# Patient Record
Sex: FEMALE | Race: BLACK OR AFRICAN AMERICAN | NOT HISPANIC OR LATINO | Employment: OTHER | URBAN - METROPOLITAN AREA
[De-identification: names, ages, dates, MRNs, and addresses within clinical notes are randomized per-mention and may not be internally consistent; named-entity substitution may affect disease eponyms.]

---

## 2020-02-19 PROBLEM — R53.83 FATIGUE: Status: ACTIVE | Noted: 2020-02-19

## 2020-02-19 PROBLEM — I10 HYPERTENSION: Status: ACTIVE | Noted: 2020-02-19

## 2020-02-19 PROBLEM — Z13.29 THYROID DISORDER SCREEN: Status: ACTIVE | Noted: 2020-02-19

## 2020-02-19 PROBLEM — M54.32 SCIATICA OF LEFT SIDE: Status: ACTIVE | Noted: 2020-02-19

## 2020-02-19 PROBLEM — E11.9 TYPE 2 DIABETES MELLITUS, WITHOUT LONG-TERM CURRENT USE OF INSULIN: Status: ACTIVE | Noted: 2020-02-19

## 2020-02-19 PROBLEM — M54.50 LUMBAR SPINE PAIN: Status: ACTIVE | Noted: 2020-02-19

## 2020-03-11 PROBLEM — Z72.0 TOBACCO ABUSE: Status: ACTIVE | Noted: 2020-03-11

## 2020-03-11 PROBLEM — E55.9 VITAMIN D DEFICIENCY: Status: ACTIVE | Noted: 2020-03-11

## 2020-03-11 PROBLEM — M48.061 SPINAL STENOSIS OF LUMBAR REGION: Status: ACTIVE | Noted: 2020-03-11

## 2020-05-25 PROBLEM — Z13.29 THYROID DISORDER SCREEN: Status: RESOLVED | Noted: 2020-02-19 | Resolved: 2020-05-25

## 2020-10-22 PROBLEM — K64.9 HEMORRHOIDS: Status: ACTIVE | Noted: 2020-10-22

## 2020-10-22 PROBLEM — K59.09 OTHER CONSTIPATION: Status: ACTIVE | Noted: 2020-10-22

## 2020-11-26 PROBLEM — E87.6 HYPOKALEMIA: Status: ACTIVE | Noted: 2020-11-26

## 2020-11-26 PROBLEM — E78.5 HLD (HYPERLIPIDEMIA): Status: ACTIVE | Noted: 2020-11-26

## 2020-11-26 PROBLEM — I24.9 ACS (ACUTE CORONARY SYNDROME): Status: ACTIVE | Noted: 2020-11-26

## 2020-11-26 PROBLEM — R07.9 CHEST PAIN: Status: ACTIVE | Noted: 2020-11-26

## 2020-12-03 PROBLEM — Z12.11 SCREENING FOR MALIGNANT NEOPLASM OF COLON: Status: ACTIVE | Noted: 2020-12-03

## 2020-12-03 LAB — CRC RECOMMENDATION EXT: NORMAL

## 2021-07-01 ENCOUNTER — PATIENT MESSAGE (OUTPATIENT)
Dept: ADMINISTRATIVE | Facility: OTHER | Age: 66
End: 2021-07-01

## 2021-07-08 LAB
LEFT EYE DM RETINOPATHY: POSITIVE
RIGHT EYE DM RETINOPATHY: POSITIVE

## 2021-10-25 LAB
LEFT EYE DM RETINOPATHY: NEGATIVE
RIGHT EYE DM RETINOPATHY: NEGATIVE

## 2021-11-11 PROBLEM — R53.83 FATIGUE: Status: RESOLVED | Noted: 2020-02-19 | Resolved: 2021-11-11

## 2021-11-11 PROBLEM — R07.9 CHEST PAIN: Status: RESOLVED | Noted: 2020-11-26 | Resolved: 2021-11-11

## 2022-05-25 PROBLEM — N39.3 STRESS INCONTINENCE (FEMALE) (MALE): Status: ACTIVE | Noted: 2017-04-04

## 2022-05-25 PROBLEM — R94.31 ABNORMAL EKG: Status: ACTIVE | Noted: 2019-07-23

## 2022-05-25 PROBLEM — F41.8 MIXED ANXIETY DEPRESSIVE DISORDER: Status: ACTIVE | Noted: 2018-10-04

## 2022-05-25 PROBLEM — G89.29 CHRONIC LEFT SHOULDER PAIN: Status: ACTIVE | Noted: 2022-05-25

## 2022-05-25 PROBLEM — E11.8 COMPLICATION OF DIABETES MELLITUS: Status: ACTIVE | Noted: 2019-07-16

## 2022-05-25 PROBLEM — M25.512 CHRONIC LEFT SHOULDER PAIN: Status: ACTIVE | Noted: 2022-05-25

## 2022-05-25 PROBLEM — E11.51 TYPE 2 DIABETES MELLITUS WITH DIABETIC PERIPHERAL ANGIOPATHY WITHOUT GANGRENE: Status: ACTIVE | Noted: 2019-07-16

## 2022-05-25 PROBLEM — I10 UNCONTROLLED HYPERTENSION: Status: ACTIVE | Noted: 2022-05-25

## 2022-10-17 PROBLEM — I73.9 PERIPHERAL VASCULAR DISEASE: Status: ACTIVE | Noted: 2018-07-03

## 2022-11-29 PROBLEM — M67.912 TENDINOPATHY OF ROTATOR CUFF, LEFT: Status: ACTIVE | Noted: 2022-11-29

## 2022-12-08 PROBLEM — R06.02 SOB (SHORTNESS OF BREATH): Status: ACTIVE | Noted: 2022-12-08

## 2022-12-08 PROBLEM — J44.1 COPD EXACERBATION: Status: ACTIVE | Noted: 2022-12-08

## 2022-12-08 PROBLEM — R09.02 HYPOXIA: Status: ACTIVE | Noted: 2022-12-08

## 2022-12-16 ENCOUNTER — PATIENT OUTREACH (OUTPATIENT)
Dept: ADMINISTRATIVE | Facility: HOSPITAL | Age: 67
End: 2022-12-16

## 2023-02-07 ENCOUNTER — PATIENT OUTREACH (OUTPATIENT)
Dept: ADMINISTRATIVE | Facility: HOSPITAL | Age: 68
End: 2023-02-07

## 2023-02-13 LAB
LEFT EYE DM RETINOPATHY: NEGATIVE
RIGHT EYE DM RETINOPATHY: NEGATIVE

## 2023-02-20 ENCOUNTER — PATIENT OUTREACH (OUTPATIENT)
Dept: ADMINISTRATIVE | Facility: HOSPITAL | Age: 68
End: 2023-02-20

## 2023-03-13 ENCOUNTER — PATIENT OUTREACH (OUTPATIENT)
Dept: ADMINISTRATIVE | Facility: HOSPITAL | Age: 68
End: 2023-03-13

## 2023-04-13 ENCOUNTER — PATIENT MESSAGE (OUTPATIENT)
Dept: ADMINISTRATIVE | Facility: OTHER | Age: 68
End: 2023-04-13

## 2023-04-21 PROBLEM — D51.0 PERNICIOUS ANEMIA: Status: ACTIVE | Noted: 2023-04-21

## 2024-10-01 PROBLEM — M67.40 GANGLION CYST: Status: ACTIVE | Noted: 2024-10-01

## 2024-11-04 ENCOUNTER — TELEPHONE (OUTPATIENT)
Dept: SMOKING CESSATION | Facility: CLINIC | Age: 69
End: 2024-11-04
Payer: MEDICARE

## 2024-11-04 NOTE — TELEPHONE ENCOUNTER
Called patient to confirm clinic intake appointment for smoking cessation on 11/5/24. Patient answered and confirmed.

## 2024-11-05 ENCOUNTER — CLINICAL SUPPORT (OUTPATIENT)
Dept: SMOKING CESSATION | Facility: CLINIC | Age: 69
End: 2024-11-05

## 2024-11-05 DIAGNOSIS — F17.200 NICOTINE DEPENDENCE: Primary | ICD-10-CM

## 2024-11-05 PROCEDURE — 99404 PREV MED CNSL INDIV APPRX 60: CPT | Mod: S$GLB,,, | Performed by: GENERAL PRACTICE

## 2024-11-05 RX ORDER — IBUPROFEN 200 MG
1 TABLET ORAL DAILY
Qty: 28 PATCH | Refills: 0 | Status: SHIPPED | OUTPATIENT
Start: 2024-11-05

## 2024-11-05 NOTE — PROGRESS NOTES
Met with patient in clinic to conduct Quit 1 intake appointment. Patient will be participating in weekly tobacco cessation meetings and will begin the prescribed tobacco cessation medication 14 mg patches. FTND of 1 indicates a low level of dependence to tobacco. BRADFORD-D of 7 is perceived as no mental distress/ depression.

## 2024-11-19 ENCOUNTER — CLINICAL SUPPORT (OUTPATIENT)
Dept: SMOKING CESSATION | Facility: CLINIC | Age: 69
End: 2024-11-19

## 2024-11-19 DIAGNOSIS — F17.200 NICOTINE DEPENDENCE: Primary | ICD-10-CM

## 2024-11-19 NOTE — PROGRESS NOTES
Individual Follow-Up Form    11/19/2024    Quit Date: TBD    Clinical Status of Patient: Outpatient    Length of Service: 30 minutes    Continuing Medication: no    Other Medications: none     Target Symptoms: Withdrawal and medication side effects. The following were  rated moderate (3) to severe (4) on TCRS:  Moderate (3): none  Severe (4): none    Comments: Spoke with patient at length via phone regarding tobacco cessation progress. Patient informed me that she never picked up the 14mg nicotine patches due to the cost. She has been working hard on quitting without the medication. She also informed me that she has decreased from 8 halves to 1-2 wholes a day. I commended her on the progress made. Her trigger is coffee in the morning and evening cigarette when idle. Discussed strategy to have coffee inside and smoke outside. She mentioned that she can go without the evening cigarette sometimes, so I advised her to replace that idle time with a new hobby, reading deep breathing or playing a game. Patient says she wanted to quit smoking because her great grandson wants her to, she has high blood pressure, and a mild heart attack. Discussed learned addiction model, cues/triggers, personal reasons for quitting, medications, goals, and making a quit attempt. The patient will continue with therapy sessions by CTTS. The patient denies any abnormal behavioral or mental changes at this time.         Diagnosis: F17.200    Next Visit: 2 weeks

## 2024-11-19 NOTE — Clinical Note
Patient informed me that she never picked up the 14mg nicotine patches due to the cost. She has been working hard on quitting without the medication. She also informed me that she has decreased from 8 halves to 1-2 wholes a day. I commended her on the progress made. Her trigger is coffee in the morning and evening cigarette when idle. Discussed strategy to have coffee inside and smoke outside. She mentioned that she can go without the evening cigarette sometimes, so I advised her to replace that idle time with a new hobby, reading deep breathing or playing a game. Patient says she wanted to quit smoking because her great grandson wants her to, she has high blood pressure, and a mild heart attack. Discussed learned addiction model, cues/triggers, personal reasons for quitting, medications, goals, and making a quit attempt. The patient will continue with therapy sessions by CTTS. The patient denies any abnormal behavioral or mental changes at this time.

## 2024-12-17 ENCOUNTER — CLINICAL SUPPORT (OUTPATIENT)
Dept: SMOKING CESSATION | Facility: CLINIC | Age: 69
End: 2024-12-17

## 2024-12-17 DIAGNOSIS — F17.200 NICOTINE DEPENDENCE: Primary | ICD-10-CM

## 2024-12-17 NOTE — PROGRESS NOTES
Individual Follow-Up Form    12/17/2024    Quit Date: 12/16/24    Clinical Status of Patient: Outpatient    Length of Service: 30 minutes    Continuing Medication: no    Other Medications: none     Target Symptoms: Withdrawal and medication side effects. The following were  rated moderate (3) to severe (4) on TCRS:  Moderate (3): none  Severe (4): none    Comments: Spoke with patient at length via phone regarding tobacco cessation progress. Patient not on any tobacco cessation medication. She has been working hard on quitting without the medication. She also informed me that she ran out of cigarettes on Sam 12/15/24 and did not buy anymore. Her quit date is 12/16/24. I commended her on quitting. Her trigger is coffee in the morning and meal. She was drinking 2 cups of coffee and has decreased to one cup and switched to hot tea at times. She alos uses a piece of candy after meal to get through the cravings. She is spending time with her sister that does not smoke and they are always busy doing something. Encouraged her to read, deep breathing or playing a game. Patient says she wanted to quit smoking because her great grandson wants her to, she has high blood pressure, and a mild heart attack. Discussed strategies, controlling environment, cues, triggers, new goals set. Introduced high risk situations with preparation interventions, caffeine similarities with withdrawal issues of habit and nicotine, Alcohol, Understanding urges, cravings, stress and relaxation. The patient will continue with therapy sessions by CTTS. The patient denies any abnormal behavioral or mental changes at this time.     *created addendum to add charge     Diagnosis: F17.200    Next Visit: 2 weeks

## 2025-01-21 ENCOUNTER — TELEPHONE (OUTPATIENT)
Dept: SMOKING CESSATION | Facility: CLINIC | Age: 70
End: 2025-01-21
Payer: MEDICARE

## 2025-02-06 ENCOUNTER — CLINICAL SUPPORT (OUTPATIENT)
Dept: SMOKING CESSATION | Facility: CLINIC | Age: 70
End: 2025-02-06
Payer: MEDICAID

## 2025-02-06 DIAGNOSIS — F17.200 NICOTINE DEPENDENCE: Primary | ICD-10-CM

## 2025-02-06 PROCEDURE — 99999 PR PBB SHADOW E&M-EST. PATIENT-LVL I: CPT | Mod: PBBFAC,,,

## 2025-02-06 PROCEDURE — 99406 BEHAV CHNG SMOKING 3-10 MIN: CPT | Mod: S$PBB,,,

## 2025-05-05 ENCOUNTER — TELEPHONE (OUTPATIENT)
Dept: SMOKING CESSATION | Facility: CLINIC | Age: 70
End: 2025-05-05
Payer: MEDICARE

## 2025-05-29 ENCOUNTER — CLINICAL SUPPORT (OUTPATIENT)
Dept: SMOKING CESSATION | Facility: CLINIC | Age: 70
End: 2025-05-29
Payer: MEDICARE

## 2025-05-29 DIAGNOSIS — F17.200 NICOTINE DEPENDENCE: Primary | ICD-10-CM

## 2025-05-29 NOTE — PROGRESS NOTES
Spoke with patient today in regard to smoking cessation progress for 6-month telephone follow up.  Patient states that she is tobacco free.  Commended patient on their quit.  Patient states that she is not having cravings and was [facility].  Informed patient of benefit period, future follow up, and contact information if any further help or support is needed.  Will complete smart form for 6 month follow up on Quit attempt #1.